# Patient Record
Sex: MALE | Race: BLACK OR AFRICAN AMERICAN | Employment: OTHER | URBAN - METROPOLITAN AREA
[De-identification: names, ages, dates, MRNs, and addresses within clinical notes are randomized per-mention and may not be internally consistent; named-entity substitution may affect disease eponyms.]

---

## 2017-04-15 ENCOUNTER — HOSPITAL ENCOUNTER (EMERGENCY)
Age: 67
Discharge: HOME | End: 2017-04-15
Attending: EMERGENCY MEDICINE
Payer: MEDICARE

## 2017-04-15 ENCOUNTER — APPOINTMENT (OUTPATIENT)
Dept: GENERAL RADIOLOGY | Age: 67
End: 2017-04-15
Attending: EMERGENCY MEDICINE
Payer: MEDICARE

## 2017-04-15 VITALS
HEART RATE: 112 BPM | TEMPERATURE: 97.9 F | HEIGHT: 71 IN | RESPIRATION RATE: 22 BRPM | SYSTOLIC BLOOD PRESSURE: 189 MMHG | BODY MASS INDEX: 44.1 KG/M2 | DIASTOLIC BLOOD PRESSURE: 110 MMHG | WEIGHT: 315 LBS | OXYGEN SATURATION: 99 %

## 2017-04-15 DIAGNOSIS — I10 ESSENTIAL HYPERTENSION: ICD-10-CM

## 2017-04-15 DIAGNOSIS — S99.922A TOE INJURY, LEFT, INITIAL ENCOUNTER: Primary | ICD-10-CM

## 2017-04-15 PROCEDURE — 99281 EMR DPT VST MAYX REQ PHY/QHP: CPT

## 2017-04-15 PROCEDURE — 73660 X-RAY EXAM OF TOE(S): CPT

## 2017-04-15 RX ORDER — INSULIN GLARGINE 100 [IU]/ML
60 INJECTION, SOLUTION SUBCUTANEOUS
COMMUNITY

## 2017-04-15 RX ORDER — CLINDAMYCIN HYDROCHLORIDE 300 MG/1
300 CAPSULE ORAL 4 TIMES DAILY
Qty: 28 CAP | Refills: 0 | Status: SHIPPED | OUTPATIENT
Start: 2017-04-15 | End: 2017-04-22

## 2017-04-15 NOTE — ED PROVIDER NOTES
Patient is a 79 y.o. male presenting with toe problem. The history is provided by the patient. Toe Injury    This is a new problem. The current episode started yesterday. The problem has not changed since onset. Pain location: Right great toe nail. The patient is experiencing no pain. Pertinent negatives include no numbness, full range of motion, no stiffness, no tingling, no itching, no back pain and no neck pain. There has been a history of trauma (Stubbed toe nail, some bleeding ). Past Medical History:   Diagnosis Date    Diabetes ([address]     Hyperlipidemia     Hypertension        Past Surgical History:   Procedure Laterality Date    HX TONSILLECTOMY      HX UROLOGICAL           History reviewed. No pertinent family history. Social History     Social History    Marital status: LEGALLY      Spouse name: N/A    Number of children: N/A    Years of education: N/A     Occupational History    Not on file. Social History Main Topics    Smoking status: Former Smoker    Smokeless tobacco: Not on file    Alcohol use No    Drug use: No    Sexual activity: Not on file     Other Topics Concern    Not on file     Social History Narrative    No narrative on file         ALLERGIES: Penicillins    Review of Systems   Constitutional: Negative for chills and fever. HENT: Negative for ear pain, rhinorrhea and sore throat. Eyes: Negative for pain and redness. Respiratory: Negative for cough and shortness of breath. Cardiovascular: Negative for chest pain. Gastrointestinal: Negative for abdominal pain, constipation, diarrhea, nausea and vomiting. Genitourinary: Negative for dysuria. Musculoskeletal: Negative for arthralgias, back pain, gait problem, joint swelling, neck pain and stiffness. Toe nail lifted, mild bleeding   Skin: Negative. Negative for itching. Neurological: Negative for dizziness, tingling, light-headedness, numbness and headaches. Psychiatric/Behavioral: Negative. Vitals:    04/15/17 1127   BP: (!) 189/110   Pulse: (!) 112   Resp: 22   Temp: 97.9 °F (36.6 °C)   SpO2: 99%   Weight: 147 kg (324 lb)   Height: 5' 11\" (1.803 m)            Physical Exam   Constitutional: He is oriented to person, place, and time. He appears well-developed and well-nourished. HENT:   Head: Normocephalic and atraumatic. Right Ear: Tympanic membrane, external ear and ear canal normal.   Left Ear: Tympanic membrane, external ear and ear canal normal.   Nose: Nose normal.   Mouth/Throat: Oropharynx is clear and moist and mucous membranes are normal.   Eyes: Conjunctivae and EOM are normal. Pupils are equal, round, and reactive to light. Neck: Normal range of motion. Cardiovascular: Normal rate, regular rhythm and normal heart sounds. Pulmonary/Chest: Effort normal and breath sounds normal.   Abdominal: Soft. Bowel sounds are normal. There is no tenderness. Musculoskeletal: Normal range of motion. Left ankle: Normal.        Left foot: There is normal range of motion, no tenderness, no bony tenderness, no swelling, normal capillary refill, no crepitus, no deformity and no laceration. Left great toenail still intact, attached to nail bed, mild bleeding, small abrasion from medial edge of nail bed. No tenderness to toe, pain with ROM, swelling, or bruising. Neurological: He is alert and oriented to person, place, and time. Skin: Skin is warm and dry. Psychiatric: He has a normal mood and affect. His behavior is normal. Judgment and thought content normal.   Nursing note and vitals reviewed.        MDM  Number of Diagnoses or Management Options  Essential hypertension: new and requires workup  Toe injury, left, initial encounter: new and requires workup  Diagnosis management comments: DDx: fx, nail avulsion, abrasion, ingrown toenail, gout, arthritis, overuse injury, osteomyelitis, septic joint, cellulitis, Downey's neuroma, stress Fx, plantar fasciitis, tendonitis, hammer toe, bunion, bunionette, diabetic ulcer, neuropathy    XR IMPRESSION:  Spherical ossicle arising from the base of the distal phalanx of the great toe  is suggestive of avulsion fracture. Chronicity cannot be assessed. Please  correlate with area of pain. Mild hallux valgus. Pt without toe pain, doubt acute fracture, no issue walking, fx visualized by radiology likely not acute. Nail intact, no evidence of infection, will have patient keep area clean and covered and with cover with antibiotics, does not have podiatrist, will refer for close follow up. Pt has not taken BP meds for 2 days as he was out of town and forgot meds, has not been home yet, will take meds upon arrival home today. Pt denies HA, CP, or SOB. Pt given strict ED precautions. Pt instructions:  Finish all antibiotics as prescribed. Take your home blood pressure and diabetes medications as prescribed. Check blood pressure and blood sugar readings at home and keep log. Follow up with your primary doctor and podiatrist in 2 days. Pt results have been reviewed with them. They have been counseled regarding diagnosis, treatment, and plan. Pt verbally conveys understanding and agreement of the signs, symptoms, diagnosis, treatment and prognosis and additionally agrees to follow up as discussed. Pt also agrees with the care-plan and conveys that all of their questions have been answered. I have also provided discharge instructions for them that include: educational information regarding their diagnosis and treatment, and list of reasons why they would want to return to the ED prior to their follow-up appointment, should their condition change.    Nasim Lama PA-C 12:41 PM        Amount and/or Complexity of Data Reviewed  Tests in the radiology section of CPT®: ordered and reviewed  Discussion of test results with the performing providers: yes  Decide to obtain previous medical records or to obtain history from someone other than the patient: yes  Obtain history from someone other than the patient: yes  Review and summarize past medical records: yes  Discuss the patient with other providers: yes  Independent visualization of images, tracings, or specimens: yes    Risk of Complications, Morbidity, and/or Mortality  Presenting problems: moderate  Diagnostic procedures: moderate  Management options: moderate    Patient Progress  Patient progress: stable    ED Course       Procedures    Diagnosis:   1. Toe injury, left, initial encounter    2. Essential hypertension          Disposition: home    Follow-up Information     Follow up With Details Comments Contact Info    [facility] EMERGENCY DEPT  As needed, If symptoms worsen [address] [facility]    Trey Milian MD In 2 days  [address] [facility] Dre Romero DPM Go in 2 days  [address] [id number] [phone]            Patient's Medications   Start Taking    CLINDAMYCIN (CLEOCIN) 300 MG CAPSULE    Take 1 Cap by mouth four (4) times daily for 7 days. Continue Taking    INSULIN GLARGINE (LANTUS) 100 UNIT/ML INJECTION    60 Units by SubCUTAneous route nightly. LISINOPRIL PO    Take  by mouth daily. METFORMIN HCL (METFORMIN PO)    Take  by mouth two (2) times a day.    These Medications have changed    No medications on file   Stop Taking    No medications on file

## 2017-04-15 NOTE — ED TRIAGE NOTES
Pt c/o injury to right great toe yesterday. States he noted bleeding at time of injury. States h/o diabetes  Pt hypertensive at triage, states he has not taken meds x 2 days.

## 2018-02-05 PROBLEM — N52.9 ED (ERECTILE DYSFUNCTION) OF ORGANIC ORIGIN: Status: ACTIVE | Noted: 2018-02-05
